# Patient Record
Sex: FEMALE | Race: WHITE | URBAN - METROPOLITAN AREA
[De-identification: names, ages, dates, MRNs, and addresses within clinical notes are randomized per-mention and may not be internally consistent; named-entity substitution may affect disease eponyms.]

---

## 2023-11-15 ENCOUNTER — OFFICE VISIT (OUTPATIENT)
Dept: URBAN - METROPOLITAN AREA CLINIC 105 | Facility: CLINIC | Age: 78
End: 2023-11-15
Payer: MEDICARE

## 2023-11-15 ENCOUNTER — LAB OUTSIDE AN ENCOUNTER (OUTPATIENT)
Dept: URBAN - METROPOLITAN AREA CLINIC 105 | Facility: CLINIC | Age: 78
End: 2023-11-15

## 2023-11-15 VITALS
BODY MASS INDEX: 31.89 KG/M2 | WEIGHT: 180 LBS | HEART RATE: 80 BPM | TEMPERATURE: 97.7 F | DIASTOLIC BLOOD PRESSURE: 81 MMHG | SYSTOLIC BLOOD PRESSURE: 142 MMHG | HEIGHT: 63 IN

## 2023-11-15 DIAGNOSIS — Z92.3 HISTORY OF THERAPEUTIC RADIATION: ICD-10-CM

## 2023-11-15 DIAGNOSIS — R19.4 CHANGE IN BOWEL HABITS: ICD-10-CM

## 2023-11-15 DIAGNOSIS — Z92.21 HISTORY OF CHEMOTHERAPY: ICD-10-CM

## 2023-11-15 DIAGNOSIS — Z85.3 HISTORY OF BREAST CANCER: ICD-10-CM

## 2023-11-15 DIAGNOSIS — Z90.13 HISTORY OF BILATERAL MASTECTOMY: ICD-10-CM

## 2023-11-15 DIAGNOSIS — Z85.72 HISTORY OF LYMPHOMA: ICD-10-CM

## 2023-11-15 DIAGNOSIS — R11.10 HEAVING: ICD-10-CM

## 2023-11-15 DIAGNOSIS — R14.0 BLOATING: ICD-10-CM

## 2023-11-15 DIAGNOSIS — R19.7 INTERMITTENT DIARRHEA: ICD-10-CM

## 2023-11-15 DIAGNOSIS — R11.14 BILIOUS VOMITING WITH NAUSEA: ICD-10-CM

## 2023-11-15 DIAGNOSIS — R19.5 LOOSE STOOLS: ICD-10-CM

## 2023-11-15 PROBLEM — 428529004: Status: ACTIVE | Noted: 2023-11-15

## 2023-11-15 PROBLEM — 429087003: Status: ACTIVE | Noted: 2023-11-15

## 2023-11-15 PROBLEM — 429479009: Status: ACTIVE | Noted: 2023-11-15

## 2023-11-15 PROBLEM — 429014004: Status: ACTIVE | Noted: 2023-11-15

## 2023-11-15 PROCEDURE — 99204 OFFICE O/P NEW MOD 45 MIN: CPT | Performed by: INTERNAL MEDICINE

## 2023-11-15 RX ORDER — FLUOCINONIDE 0.5 MG/G
CREAM TOPICAL
Qty: 60 GRAM | Status: ACTIVE | COMMUNITY

## 2023-11-15 RX ORDER — HYDROXYZINE HYDROCHLORIDE 25 MG/1
TABLET, FILM COATED ORAL
Qty: 30 TABLET | Status: ACTIVE | COMMUNITY

## 2023-11-15 RX ORDER — ATORVASTATIN CALCIUM, FILM COATED 10 MG/1
TAKE 1 TABLET (10 MG TOTAL) BY MOUTH IN THE MORNING TABLET ORAL
Qty: 90 EACH | Refills: 0 | Status: ACTIVE | COMMUNITY

## 2023-11-15 RX ORDER — ANASTROZOLE 1 MG/1
TAKE 1 TABLET BY MOUTH EVERY DAY TABLET, COATED ORAL
Qty: 90 EACH | Refills: 0 | Status: ACTIVE | COMMUNITY

## 2023-11-15 RX ORDER — DULOXETINE HYDROCHLORIDE 60 MG/1
TAKE 1 CAPSULE (60 MG TOTAL) BY MOUTH IN THE MORNING CAPSULE, DELAYED RELEASE ORAL
Qty: 90 EACH | Refills: 0 | Status: ACTIVE | COMMUNITY

## 2023-11-15 NOTE — HPI-TODAY'S VISIT:
patient comes in a kind referral from Dr. Julianne Reyes.  A copy of this consultation will be sent to the referring physician. she says that sometimes when having a bowel movement she will vomit. this started about a couple of years ago. she says it is sporadic. can go a month without it happening again.  she gets some gas and bloating.  - she says that her stools are not as consistently solid., she has some soft mushy stools that are sticky and hard to clean up. In 2018 she had a double mastectomy followed by chemo and radiation. 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 Follows with Dr. Amaya. She had a colonoscopy this year she thiniks it was done at [facility].

## 2023-11-19 ENCOUNTER — LAB OUTSIDE AN ENCOUNTER (OUTPATIENT)
Dept: URBAN - METROPOLITAN AREA CLINIC 105 | Facility: CLINIC | Age: 78
End: 2023-11-19

## 2023-11-24 LAB
ADENOVIRUS F 40/41: NOT DETECTED
CALPROTECTIN, STOOL - QDX: (no result)
CAMPYLOBACTER: NOT DETECTED
CLOSTRIDIUM DIFFICILE: NOT DETECTED
ENTAMOEBA HISTOLYTICA: NOT DETECTED
ENTEROAGGREGATIVE E.COLI: NOT DETECTED
ENTEROTOXIGENIC E.COLI: NOT DETECTED
ESCHERICHIA COLI O157: NOT DETECTED
FECAL FAT, QUALITATIVE: (no result)
GIARDIA LAMBLIA: NOT DETECTED
NOROVIRUS GI/GII: NOT DETECTED
PANCREATICELASTASE ELISA, STOOL: (no result)
ROTAVIRUS A: NOT DETECTED
SALMONELLA SPP.: NOT DETECTED
SHIGA-LIKE TOXIN PRODUCING E.COLI: NOT DETECTED
SHIGELLA SPP. / ENTEROINVASIVE E.COLI: NOT DETECTED
VIBRIO PARAHAEMOLYTICUS: NOT DETECTED
VIBRIO SPP.: NOT DETECTED
YERSINIA ENTEROCOLITICA: NOT DETECTED

## 2023-12-21 ENCOUNTER — OFFICE VISIT (OUTPATIENT)
Dept: URBAN - METROPOLITAN AREA CLINIC 91 | Facility: CLINIC | Age: 78
End: 2023-12-21
Payer: MEDICARE

## 2023-12-21 ENCOUNTER — CLAIMS CREATED FROM THE CLAIM WINDOW (OUTPATIENT)
Dept: URBAN - METROPOLITAN AREA SURGERY CENTER 16 | Facility: SURGERY CENTER | Age: 78
End: 2023-12-21

## 2023-12-21 ENCOUNTER — OUT OF OFFICE VISIT (OUTPATIENT)
Dept: URBAN - METROPOLITAN AREA SURGERY CENTER 16 | Facility: SURGERY CENTER | Age: 78
End: 2023-12-21
Payer: MEDICARE

## 2023-12-21 ENCOUNTER — CLAIMS CREATED FROM THE CLAIM WINDOW (OUTPATIENT)
Dept: URBAN - METROPOLITAN AREA CLINIC 4 | Facility: CLINIC | Age: 78
End: 2023-12-21
Payer: MEDICARE

## 2023-12-21 DIAGNOSIS — K31.89 OTHER DISEASES OF STOMACH AND DUODENUM: ICD-10-CM

## 2023-12-21 DIAGNOSIS — K44.9 HIATAL HERNIA: ICD-10-CM

## 2023-12-21 DIAGNOSIS — K76.0 FATTY LIVER: ICD-10-CM

## 2023-12-21 DIAGNOSIS — K21.9 ACID REFLUX: ICD-10-CM

## 2023-12-21 DIAGNOSIS — K29.80 ACUTE DUODENITIS: ICD-10-CM

## 2023-12-21 DIAGNOSIS — K80.20 CHOLELITHIASIS: ICD-10-CM

## 2023-12-21 DIAGNOSIS — K29.81 DUODENITIS WITH BLEEDING: ICD-10-CM

## 2023-12-21 DIAGNOSIS — K29.80 PEPTIC DUODENITIS: ICD-10-CM

## 2023-12-21 DIAGNOSIS — K29.70 GASTRITIS: ICD-10-CM

## 2023-12-21 DIAGNOSIS — R11.14 BILIOUS VOMITING WITH NAUSEA: ICD-10-CM

## 2023-12-21 DIAGNOSIS — K21.00 GASTRO-ESOPHAGEAL REFLUX DISEASE WITH ESOPHAGITIS, WITHOUT BLEEDING: ICD-10-CM

## 2023-12-21 PROCEDURE — 88305 TISSUE EXAM BY PATHOLOGIST: CPT | Performed by: PATHOLOGY

## 2023-12-21 PROCEDURE — 88312 SPECIAL STAINS GROUP 1: CPT | Performed by: PATHOLOGY

## 2023-12-21 PROCEDURE — G8907 PT DOC NO EVENTS ON DISCHARG: HCPCS | Performed by: INTERNAL MEDICINE

## 2023-12-21 PROCEDURE — 43239 EGD BIOPSY SINGLE/MULTIPLE: CPT | Performed by: INTERNAL MEDICINE

## 2023-12-21 PROCEDURE — 00731 ANES UPR GI NDSC PX NOS: CPT | Performed by: NURSE ANESTHETIST, CERTIFIED REGISTERED

## 2023-12-21 PROCEDURE — 76700 US EXAM ABDOM COMPLETE: CPT | Performed by: INTERNAL MEDICINE

## 2023-12-21 RX ORDER — FLUOCINONIDE 0.5 MG/G
CREAM TOPICAL
Qty: 60 GRAM | Status: ACTIVE | COMMUNITY

## 2023-12-21 RX ORDER — ANASTROZOLE 1 MG/1
TAKE 1 TABLET BY MOUTH EVERY DAY TABLET, COATED ORAL
Qty: 90 EACH | Refills: 0 | Status: ACTIVE | COMMUNITY

## 2023-12-21 RX ORDER — PANTOPRAZOLE SODIUM 20 MG/1
1 TABLET TABLET, DELAYED RELEASE ORAL ONCE A DAY
Qty: 90 TABLET | Refills: 3 | OUTPATIENT
Start: 2023-12-21

## 2023-12-21 RX ORDER — DULOXETINE HYDROCHLORIDE 60 MG/1
TAKE 1 CAPSULE (60 MG TOTAL) BY MOUTH IN THE MORNING CAPSULE, DELAYED RELEASE ORAL
Qty: 90 EACH | Refills: 0 | Status: ACTIVE | COMMUNITY

## 2023-12-21 RX ORDER — ATORVASTATIN CALCIUM, FILM COATED 10 MG/1
TAKE 1 TABLET (10 MG TOTAL) BY MOUTH IN THE MORNING TABLET ORAL
Qty: 90 EACH | Refills: 0 | Status: ACTIVE | COMMUNITY

## 2023-12-21 RX ORDER — HYDROXYZINE HYDROCHLORIDE 25 MG/1
TABLET, FILM COATED ORAL
Qty: 30 TABLET | Status: ACTIVE | COMMUNITY

## 2023-12-26 ENCOUNTER — TELEPHONE ENCOUNTER (OUTPATIENT)
Dept: URBAN - METROPOLITAN AREA CLINIC 105 | Facility: CLINIC | Age: 78
End: 2023-12-26

## 2023-12-26 RX ORDER — PANCRELIPASE LIPASE, PANCRELIPASE PROTEASE, PANCRELIPASE AMYLASE 40000; 126000; 168000 [USP'U]/1; [USP'U]/1; [USP'U]/1
2 PO THREE TIMES DAILY WITH MEALS CAPSULE, DELAYED RELEASE ORAL
Qty: 200 | Refills: 6 | OUTPATIENT
Start: 2023-12-27 | End: 2024-07-24

## 2024-02-27 ENCOUNTER — OV EP (OUTPATIENT)
Dept: URBAN - METROPOLITAN AREA CLINIC 105 | Facility: CLINIC | Age: 79
End: 2024-02-27
Payer: MEDICARE

## 2024-02-27 VITALS
BODY MASS INDEX: 30.62 KG/M2 | TEMPERATURE: 96.4 F | SYSTOLIC BLOOD PRESSURE: 146 MMHG | DIASTOLIC BLOOD PRESSURE: 85 MMHG | HEART RATE: 97 BPM | WEIGHT: 172.8 LBS | HEIGHT: 63 IN

## 2024-02-27 DIAGNOSIS — K86.81 EXOCRINE PANCREATIC INSUFFICIENCY: ICD-10-CM

## 2024-02-27 DIAGNOSIS — K20.90 ESOPHAGITIS: ICD-10-CM

## 2024-02-27 DIAGNOSIS — K44.9 HIATAL HERNIA: ICD-10-CM

## 2024-02-27 DIAGNOSIS — K76.0 FATTY LIVER: ICD-10-CM

## 2024-02-27 DIAGNOSIS — Z85.3 HISTORY OF BREAST CANCER: ICD-10-CM

## 2024-02-27 DIAGNOSIS — E78.2 MIXED HYPERLIPIDEMIA: ICD-10-CM

## 2024-02-27 DIAGNOSIS — K80.20 GALLSTONES: ICD-10-CM

## 2024-02-27 DIAGNOSIS — K21.9 CHRONIC GERD: ICD-10-CM

## 2024-02-27 PROBLEM — 235919008: Status: ACTIVE | Noted: 2024-02-27

## 2024-02-27 PROBLEM — 267434003: Status: ACTIVE | Noted: 2024-02-27

## 2024-02-27 PROBLEM — 197321007: Status: ACTIVE | Noted: 2024-02-27

## 2024-02-27 PROBLEM — 235595009: Status: ACTIVE | Noted: 2024-02-27

## 2024-02-27 PROCEDURE — 99214 OFFICE O/P EST MOD 30 MIN: CPT | Performed by: INTERNAL MEDICINE

## 2024-02-27 RX ORDER — HYDROXYZINE HYDROCHLORIDE 25 MG/1
TABLET, FILM COATED ORAL
Qty: 30 TABLET | Status: ACTIVE | COMMUNITY

## 2024-02-27 RX ORDER — ATORVASTATIN CALCIUM, FILM COATED 10 MG/1
TAKE 1 TABLET (10 MG TOTAL) BY MOUTH IN THE MORNING TABLET ORAL
Qty: 90 EACH | Refills: 0 | Status: ACTIVE | COMMUNITY

## 2024-02-27 RX ORDER — ANASTROZOLE 1 MG/1
TAKE 1 TABLET BY MOUTH EVERY DAY TABLET, COATED ORAL
Qty: 90 EACH | Refills: 0 | Status: ACTIVE | COMMUNITY

## 2024-02-27 RX ORDER — FLUOCINONIDE 0.5 MG/G
CREAM TOPICAL
Qty: 60 GRAM | Status: ACTIVE | COMMUNITY

## 2024-02-27 RX ORDER — DULOXETINE HYDROCHLORIDE 60 MG/1
TAKE 1 CAPSULE (60 MG TOTAL) BY MOUTH IN THE MORNING CAPSULE, DELAYED RELEASE ORAL
Qty: 90 EACH | Refills: 0 | Status: ACTIVE | COMMUNITY

## 2024-02-27 RX ORDER — PANCRELIPASE LIPASE, PANCRELIPASE PROTEASE, PANCRELIPASE AMYLASE 40000; 126000; 168000 [USP'U]/1; [USP'U]/1; [USP'U]/1
2 PO THREE TIMES DAILY WITH MEALS CAPSULE, DELAYED RELEASE ORAL
Qty: 200 | Refills: 6 | Status: ACTIVE | COMMUNITY
Start: 2023-12-27 | End: 2024-07-24

## 2024-02-27 RX ORDER — PANTOPRAZOLE SODIUM 20 MG/1
1 TABLET TABLET, DELAYED RELEASE ORAL ONCE A DAY
Qty: 90 TABLET | Refills: 3 | Status: ACTIVE | COMMUNITY
Start: 2023-12-21

## 2024-02-27 NOTE — HPI-TODAY'S VISIT:
patient comes in a kind referral from Dr. Julianne Reyes.  A copy of this consultation will be sent to the referring physician. she says that sometimes when having a bowel movement she will vomit. this started about a couple of years ago. she says it is sporadic. can go a month without it happening again.  she gets some gas and bloating.  - she says that her stools are not as consistently solid., she has some soft mushy stools that are sticky and hard to clean up. In 2018 she had a double mastectomy followed by chemo and radiation. 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 Follows with Dr. Amaya. She had a colonoscopy this year she thiniks it was done at [facility]. -  02/27/2024: This patient comes for follow-up in December 2023 I did upper GI endoscopy secondary to nausea and intermittent vomiting while having a bowel movement.  She had a hiatal hernia and erosive grade B esophagitis along with severe hemorrhagic gastritis and peptic duodenitis.  I started her on pantoprazole 40 mg daily.  She was also having intermittent loose stools.  The stool studies showed exocrine pancreatic insufficiency.  I gave her samples of Zenpep that she endorsed improve the quality and consistency and frequency of her stools.  Complete abdominal ultrasound was also done in December 2023 showing hepatic steatosis and gallstones.  The pancreas was obscured.  The patient returns to the [facility] office today in follow-up. - Pt says that as long she takes the Zenpep her stools are formed. "It's really nice to have a formed stool".  she is having much more normal stools. she stopped drinking her one glass of wine in the evenings.   she says that she has lost about 10 pounds since her last visit. has been lessening the amount of sugary beverages.

## 2024-08-27 ENCOUNTER — DASHBOARD ENCOUNTERS (OUTPATIENT)
Age: 79
End: 2024-08-27

## 2024-08-27 ENCOUNTER — OFFICE VISIT (OUTPATIENT)
Dept: URBAN - METROPOLITAN AREA CLINIC 105 | Facility: CLINIC | Age: 79
End: 2024-08-27
Payer: MEDICARE

## 2024-08-27 VITALS
BODY MASS INDEX: 25.69 KG/M2 | HEIGHT: 63 IN | TEMPERATURE: 97.2 F | HEART RATE: 98 BPM | DIASTOLIC BLOOD PRESSURE: 74 MMHG | SYSTOLIC BLOOD PRESSURE: 105 MMHG | WEIGHT: 145 LBS

## 2024-08-27 DIAGNOSIS — K21.9 CHRONIC GERD: ICD-10-CM

## 2024-08-27 DIAGNOSIS — K86.81 EXOCRINE PANCREATIC INSUFFICIENCY: ICD-10-CM

## 2024-08-27 DIAGNOSIS — K44.9 HIATAL HERNIA: ICD-10-CM

## 2024-08-27 DIAGNOSIS — K80.20 GALLSTONES: ICD-10-CM

## 2024-08-27 DIAGNOSIS — R19.4 CHANGE IN BOWEL HABITS: ICD-10-CM

## 2024-08-27 DIAGNOSIS — Z85.3 HISTORY OF BREAST CANCER: ICD-10-CM

## 2024-08-27 DIAGNOSIS — K76.0 FATTY LIVER: ICD-10-CM

## 2024-08-27 PROCEDURE — 99214 OFFICE O/P EST MOD 30 MIN: CPT | Performed by: INTERNAL MEDICINE

## 2024-08-27 RX ORDER — FLUOCINONIDE 0.5 MG/G
CREAM TOPICAL
Qty: 60 GRAM | Status: ON HOLD | COMMUNITY

## 2024-08-27 RX ORDER — HYDROXYZINE HYDROCHLORIDE 25 MG/1
TABLET, FILM COATED ORAL
Qty: 30 TABLET | Status: ACTIVE | COMMUNITY

## 2024-08-27 RX ORDER — DULOXETINE HYDROCHLORIDE 60 MG/1
TAKE 1 CAPSULE (60 MG TOTAL) BY MOUTH IN THE MORNING CAPSULE, DELAYED RELEASE ORAL
Qty: 90 EACH | Refills: 0 | Status: ON HOLD | COMMUNITY

## 2024-08-27 RX ORDER — ANASTROZOLE 1 MG/1
TAKE 1 TABLET BY MOUTH EVERY DAY TABLET, COATED ORAL
Qty: 90 EACH | Refills: 0 | Status: ON HOLD | COMMUNITY

## 2024-08-27 RX ORDER — ATORVASTATIN CALCIUM, FILM COATED 10 MG/1
TAKE 1 TABLET (10 MG TOTAL) BY MOUTH IN THE MORNING TABLET ORAL
Qty: 90 EACH | Refills: 0 | Status: ON HOLD | COMMUNITY

## 2024-08-27 RX ORDER — PANTOPRAZOLE SODIUM 20 MG/1
1 TABLET TABLET, DELAYED RELEASE ORAL ONCE A DAY
Qty: 90 TABLET | Refills: 3 | Status: ACTIVE | COMMUNITY
Start: 2023-12-21

## 2024-08-27 NOTE — HPI-TODAY'S VISIT:
patient comes in a kind referral from Dr. Julianne Reyes.  A copy of this consultation will be sent to the referring physician. she says that sometimes when having a bowel movement she will vomit. this started about a couple of years ago. she says it is sporadic. can go a month without it happening again.  she gets some gas and bloating.  - she says that her stools are not as consistently solid., she has some soft mushy stools that are sticky and hard to clean up. In 2018 she had a double mastectomy followed by chemo and radiation. 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 Follows with Dr. Amaya. She had a colonoscopy this year she thiniks it was done at [facility]. -  02/27/2024: This patient comes for follow-up in December 2023 I did upper GI endoscopy secondary to nausea and intermittent vomiting while having a bowel movement.  She had a hiatal hernia and erosive grade B esophagitis along with severe hemorrhagic gastritis and peptic duodenitis.  I started her on pantoprazole 40 mg daily.  She was also having intermittent loose stools.  The stool studies showed exocrine pancreatic insufficiency.  I gave her samples of Zenpep that she endorsed improve the quality and consistency and frequency of her stools.  Complete abdominal ultrasound was also done in December 2023 showing hepatic steatosis and gallstones.  The pancreas was obscured.  The patient returns to the [facility] office today in follow-up. - Pt says that as long she takes the Zenpep her stools are formed. "It's really nice to have a formed stool".  she is having much more normal stools. she stopped drinking her one glass of wine in the evenings.   she says that she has lost about 10 pounds since her last visit. has been lessening the amount of sugary beverages. - 8/27/2024: Patient returns to the office in follow-up.  She has exocrine pancreatic insufficiency that she struggled with for years.  She is doing much better since we started her on pancreatic enzyme replacement.  She is not having diarrhea bloating or gas.  She did have some evidence of fatty liver and has lost 40 pounds since her initial visit.

## 2024-08-29 ENCOUNTER — TELEPHONE ENCOUNTER (OUTPATIENT)
Dept: URBAN - METROPOLITAN AREA CLINIC 105 | Facility: CLINIC | Age: 79
End: 2024-08-29

## 2024-08-29 LAB
A/G RATIO: 2.2
ALBUMIN: 4.3
ALKALINE PHOSPHATASE: 41
ALT (SGPT): 13
AST (SGOT): 22
BILIRUBIN, TOTAL: 0.4
BUN/CREATININE RATIO: (no result)
BUN: 15
CALCIUM: 10
CARBON DIOXIDE, TOTAL: 23
CHLORIDE: 103
CREATININE: 0.78
EGFR: 77
FOLATE, SERUM: 4.3
GLOBULIN, TOTAL: 2
GLUCOSE: 83
HEMATOCRIT: 40.9
HEMOGLOBIN: 13.4
MCH: 32.1
MCHC: 32.8
MCV: 97.8
MPV: 10.8
PLATELET COUNT: 208
POTASSIUM: 4.7
PROTEIN, TOTAL: 6.3
RDW: 12.2
RED BLOOD CELL COUNT: 4.18
SODIUM: 140
VITAMIN B12: 390
WHITE BLOOD CELL COUNT: 5.4

## 2024-08-29 RX ORDER — FOLIC ACID 1 MG/1
1 TABLET TABLET ORAL ONCE A DAY
Qty: 90 TABLET | Refills: 1 | OUTPATIENT
Start: 2024-08-29 | End: 2025-02-24

## 2024-12-16 ENCOUNTER — ERX REFILL RESPONSE (OUTPATIENT)
Dept: RURAL CLINIC 2 | Facility: CLINIC | Age: 79
End: 2024-12-16

## 2024-12-16 RX ORDER — PANTOPRAZOLE SODIUM 20 MG/1
TAKE 1 TABLET BY MOUTH EVERY DAY FOR 90 DAYS TABLET, DELAYED RELEASE ORAL
Qty: 90 TABLET | Refills: 2 | OUTPATIENT

## 2024-12-16 RX ORDER — PANTOPRAZOLE SODIUM 20 MG/1
1 TABLET TABLET, DELAYED RELEASE ORAL ONCE A DAY
Qty: 90 TABLET | Refills: 3 | OUTPATIENT

## 2025-01-24 ENCOUNTER — WEB ENCOUNTER (OUTPATIENT)
Dept: URBAN - METROPOLITAN AREA CLINIC 105 | Facility: CLINIC | Age: 80
End: 2025-01-24

## 2025-01-24 RX ORDER — FOLIC ACID 1 MG/1
1 TABLET TABLET ORAL ONCE A DAY
Qty: 90 TABLET | Refills: 1
Start: 2024-08-29 | End: 2025-07-26

## 2025-02-25 ENCOUNTER — OFFICE VISIT (OUTPATIENT)
Dept: URBAN - METROPOLITAN AREA CLINIC 105 | Facility: CLINIC | Age: 80
End: 2025-02-25

## 2025-02-25 ENCOUNTER — OFFICE VISIT (OUTPATIENT)
Dept: URBAN - METROPOLITAN AREA CLINIC 105 | Facility: CLINIC | Age: 80
End: 2025-02-25
Payer: MEDICARE

## 2025-02-25 VITALS
WEIGHT: 144.8 LBS | SYSTOLIC BLOOD PRESSURE: 125 MMHG | HEART RATE: 82 BPM | HEIGHT: 63 IN | BODY MASS INDEX: 25.66 KG/M2 | DIASTOLIC BLOOD PRESSURE: 74 MMHG | TEMPERATURE: 96.4 F

## 2025-02-25 DIAGNOSIS — K21.00 GASTROESOPHAGEAL REFLUX DISEASE WITH ESOPHAGITIS WITHOUT HEMORRHAGE: ICD-10-CM

## 2025-02-25 DIAGNOSIS — K86.81 EXOCRINE PANCREATIC INSUFFICIENCY: ICD-10-CM

## 2025-02-25 DIAGNOSIS — Z86.0101 PERSONAL HISTORY OF ADENOMATOUS AND SERRATED COLON POLYPS: ICD-10-CM

## 2025-02-25 DIAGNOSIS — K44.9 HIATAL HERNIA: ICD-10-CM

## 2025-02-25 DIAGNOSIS — Z85.3 HISTORY OF BREAST CANCER: ICD-10-CM

## 2025-02-25 DIAGNOSIS — K80.20 GALLSTONES: ICD-10-CM

## 2025-02-25 DIAGNOSIS — K76.0 FATTY LIVER: ICD-10-CM

## 2025-02-25 PROBLEM — 266433003: Status: ACTIVE | Noted: 2025-02-25

## 2025-02-25 PROCEDURE — 99214 OFFICE O/P EST MOD 30 MIN: CPT | Performed by: INTERNAL MEDICINE

## 2025-02-25 RX ORDER — PANTOPRAZOLE SODIUM 20 MG/1
TAKE 1 TABLET BY MOUTH EVERY DAY FOR 90 DAYS TABLET, DELAYED RELEASE ORAL
Qty: 90 TABLET | Refills: 2 | Status: ACTIVE | COMMUNITY

## 2025-02-25 RX ORDER — FOLIC ACID 1 MG/1
1 TABLET TABLET ORAL ONCE A DAY
Qty: 90 TABLET | Refills: 1 | Status: ACTIVE | COMMUNITY
Start: 2024-08-29 | End: 2025-07-26

## 2025-02-25 NOTE — HPI-TODAY'S VISIT:
She previously followed with Dr. Paiz.  Today, the patient is establishing with me since Dr. Paiz moved offices. N/v, heartburn, abd pain all resolved. She says she only had heartburn 2x in her life. She is on pantoprazole 20 mg QD. She takes Zenpep 2 pills TID. She's always been on thise dosage. Zenpep helps with BM frequency and formation. She forgot to take them one day and later had more frequency and less formation.   She saw a general surgeon to discuss cholecystectomy due to gallstones, but the surgeon did not think it was needed b/c pt was asymptomatic.  Labs 8/28/24 - Folate 4.3. CBC, CMP, B12 all normal. 11/19/23 - Fecal elastase 151.68. Stool study negative. Fecal fat, fecal calprotectin all normal. 6/20/23 - CBC normal except . CMP normal except creatinine 1.08, GFR 52. Vit D 26.4.

## 2025-02-28 ENCOUNTER — TELEPHONE ENCOUNTER (OUTPATIENT)
Dept: URBAN - METROPOLITAN AREA CLINIC 105 | Facility: CLINIC | Age: 80
End: 2025-02-28

## 2025-02-28 RX ORDER — PANCRELIPASE LIPASE, PANCRELIPASE PROTEASE, PANCRELIPASE AMYLASE 40000; 126000; 168000 [USP'U]/1; [USP'U]/1; [USP'U]/1
2 CAPSULES WITH MEALS CAPSULE, DELAYED RELEASE ORAL THREE TIMES A DAY
Qty: 540 CAPSULE | Refills: 3 | OUTPATIENT

## 2025-08-26 ENCOUNTER — OFFICE VISIT (OUTPATIENT)
Dept: URBAN - METROPOLITAN AREA CLINIC 105 | Facility: CLINIC | Age: 80
End: 2025-08-26
Payer: MEDICARE

## 2025-08-26 DIAGNOSIS — K76.0 FATTY LIVER: ICD-10-CM

## 2025-08-26 DIAGNOSIS — K80.20 GALLSTONES: ICD-10-CM

## 2025-08-26 DIAGNOSIS — Z86.0101 PERSONAL HISTORY OF ADENOMATOUS AND SERRATED COLON POLYPS: ICD-10-CM

## 2025-08-26 DIAGNOSIS — K86.81 EXOCRINE PANCREATIC INSUFFICIENCY: ICD-10-CM

## 2025-08-26 DIAGNOSIS — K44.9 HIATAL HERNIA: ICD-10-CM

## 2025-08-26 DIAGNOSIS — E53.8 FOLATE DEFICIENCY: ICD-10-CM

## 2025-08-26 DIAGNOSIS — Z85.3 HISTORY OF BREAST CANCER: ICD-10-CM

## 2025-08-26 DIAGNOSIS — K21.00 GASTROESOPHAGEAL REFLUX DISEASE WITH ESOPHAGITIS WITHOUT HEMORRHAGE: ICD-10-CM

## 2025-08-26 PROCEDURE — 99214 OFFICE O/P EST MOD 30 MIN: CPT | Performed by: INTERNAL MEDICINE

## 2025-08-26 RX ORDER — HYDROXYZINE HYDROCHLORIDE 25 MG/1
TABLET, FILM COATED ORAL
Qty: 30 TABLET | Status: ACTIVE | COMMUNITY

## 2025-08-26 RX ORDER — ATORVASTATIN CALCIUM, FILM COATED 10 MG/1
TAKE 1 TABLET (10 MG TOTAL) BY MOUTH IN THE MORNING TABLET ORAL
Qty: 90 EACH | Refills: 0 | Status: ACTIVE | COMMUNITY

## 2025-08-26 RX ORDER — PANCRELIPASE LIPASE, PANCRELIPASE PROTEASE, PANCRELIPASE AMYLASE 40000; 126000; 168000 [USP'U]/1; [USP'U]/1; [USP'U]/1
2 CAPSULES WITH MEALS CAPSULE, DELAYED RELEASE ORAL THREE TIMES A DAY
Qty: 540 CAPSULE | Refills: 3 | Status: ACTIVE | COMMUNITY

## 2025-08-26 RX ORDER — FLUOCINONIDE 0.5 MG/G
CREAM TOPICAL
Qty: 60 GRAM | Status: ACTIVE | COMMUNITY

## 2025-08-26 RX ORDER — PANTOPRAZOLE SODIUM 20 MG/1
TAKE 1 TABLET BY MOUTH EVERY DAY FOR 90 DAYS TABLET, DELAYED RELEASE ORAL
Qty: 90 TABLET | Refills: 2 | Status: ACTIVE | COMMUNITY

## 2025-08-26 RX ORDER — FOLIC ACID 1 MG/1
1 TABLET TABLET ORAL ONCE A DAY
Status: ACTIVE | COMMUNITY

## 2025-08-26 RX ORDER — FOLIC ACID 1 MG/1
1 TABLET TABLET ORAL ONCE A DAY
Qty: 90 TABLET | Refills: 3 | OUTPATIENT

## 2025-08-29 LAB
A/G RATIO: 2.2
ABSOLUTE BASOPHILS: 62
ABSOLUTE EOSINOPHILS: 342
ABSOLUTE LYMPHOCYTES: 1478
ABSOLUTE MONOCYTES: 605
ABSOLUTE NEUTROPHILS: 3114
ALBUMIN: 4.4
ALKALINE PHOSPHATASE: 47
ALT (SGPT): 11
AST (SGOT): 17
BASOPHILS: 1.1
BILIRUBIN, TOTAL: 0.4
BUN/CREATININE RATIO: (no result)
BUN: 16
CALCIUM: 9.7
CARBON DIOXIDE, TOTAL: 27
CHLORIDE: 105
CREATININE: 0.81
EGFR: 73
EOSINOPHILS: 6.1
FOLATE (FOLIC ACID), SERUM: 10.5
GLOBULIN, TOTAL: 2
GLUCOSE: 86
HEMATOCRIT: 45.4
HEMOGLOBIN: 14.8
LYMPHOCYTES: 26.4
MCH: 33.8
MCHC: 32.6
MCV: 103.7
MONOCYTES: 10.8
MPV: 10.4
NEUTROPHILS: 55.6
PLATELET COUNT: 216
POTASSIUM: 4.2
PROTEIN, TOTAL: 6.4
RDW: 12.2
RED BLOOD CELL COUNT: 4.38
SODIUM: 141
WHITE BLOOD CELL COUNT: 5.6

## 2025-08-31 ENCOUNTER — LAB OUTSIDE AN ENCOUNTER (OUTPATIENT)
Dept: URBAN - METROPOLITAN AREA CLINIC 105 | Facility: CLINIC | Age: 80
End: 2025-08-31